# Patient Record
Sex: MALE | Race: WHITE | NOT HISPANIC OR LATINO | URBAN - METROPOLITAN AREA
[De-identification: names, ages, dates, MRNs, and addresses within clinical notes are randomized per-mention and may not be internally consistent; named-entity substitution may affect disease eponyms.]

---

## 2019-06-12 ENCOUNTER — CONVERSION ENCOUNTER (OUTPATIENT)
Dept: FAMILY MEDICINE CLINIC | Facility: CLINIC | Age: 44
End: 2019-06-12

## 2019-06-16 VITALS
DIASTOLIC BLOOD PRESSURE: 86 MMHG | WEIGHT: 132 LBS | SYSTOLIC BLOOD PRESSURE: 111 MMHG | HEART RATE: 88 BPM | RESPIRATION RATE: 18 BRPM | BODY MASS INDEX: 17.49 KG/M2 | HEIGHT: 73 IN

## 2019-06-16 NOTE — PROGRESS NOTES
Visit Type:  Consult  Referring Provider:  Sameer Sales  Primary Care Provider:  Mónica    Chief Complaint:  New Pt Consult .    History of Present Illness:  Dear Dr. Sales    I had the pleasure of seeing Luciano today in the establishment of care.  As you are well aware this is a pleasant 43-year-old  male with no known history of ischemic heart disease.  He does however have a history of chronic pain, neuropathy, and   anxiety.  He presents today to establish care following a diagnosis of bradycardia.    He denies any chest pain pressure heaviness or tightness.  He denies any shortness of breath.  He denies any PND orthopnea.  He denies any syncope or near syncope.    He underwent cervical fusion surgery earlier this year and was found to have bradycardia postoperatively.  He has a bone stimulator in place.  We discussed options.  He will wait until his bone stimulator has been removed and then consider further work-up.    Impressions  Bradycardia most likely secondary to carotid manipulation from cervical fusion surgery.  Anxiety  Chronic pain  Neuropathy.  Tobacco abuse d/o    Recommendations  Consider ambulatory ECG monitor once bone stimulator has been removed.  Follow-up in 6 months time sooner should there be difficulties.  Smoking cessation      Vital Signs -Extended   Height: 73 inches   Weight: 132 pounds  Pulse rate: 88 /min     Pulse rhythm: regular       Respirations: 18 /min  Blood Pressure: 111/86 mm Hg    Calculations   Body Mass Index: 17.48      Body Surface Area (m2): 1.81      Past Medical History:     Reviewed history from 03/19/2015 and no changes required:        Asthma        Anxiety Disorder        Arthritis        Bipolar Affective Disorder        Depression        Headache, Migraine        Myocardial Infarction: questionable    Past Surgical History:     Reviewed history from 03/19/2015 and no changes required:        Left arm surgery (reconstruction)        Eye surgery         Oral Surgery    Active Medications (reviewed today):  DICLOFENAC SODIUM 75 MG ORAL TABLET DELAYED RELEASE (DICLOFENAC SODIUM) 1 tablet twice daily  GABAPENTIN 300 MG ORAL CAPSULE (GABAPENTIN) 1 tablet thee times daily  CLONAZEPAM 0.5 MG ORAL TABLET (CLONAZEPAM) 2 tablets daily    Current Allergies (reviewed today):  * STADOL (Moderate)  PHENERGAN (Moderate)  * MORPHINE (LIQUID) (Moderate)  TRAMADOL HCL (TRAMADOL HCL) (Moderate)    Family History Summary:      Reviewed history Last on 04/16/2015 and no changes required:06/16/2019      General Comments - FH:  unremarkable    Social History:     Reviewed history from 03/19/2015 and no changes required:                3 children        Disabled        Risk Factors:     Smoked Tobacco Use:  Current every day smoker     Cigarettes:  Yes -- 1/2 pack(s) per day,      Years smoked:  20 years  Smokeless Tobacco Use:  Former     Counseled to quit/cut down:  yes     Tobacco Use Comments:  tobacco dip  Passive smoke exposure:  no  Drug use:  no  HIV high-risk behavior:  no  Caffeine use:  4+ drinks per day  Alcohol use:  no  Exercise:  yes     Times per week:  8+     Type of Exercise:  walking/patient owns no vehicle  Seatbelt use:  0 %  Sun Exposure:  occasionally    Family History Risk Factors:     Family History of MI in females < 65 years old:  no     Family History of MI in males < 55 years old:  no        Review of Systems     A complete review of systems was undertaken with a pertinent information being listed in history present illness and all other systems being negative.      Physical Exam    General:      well developed, well nourished, in no acute distress.    Head:      normocephalic and atraumatic.    Eyes:      No icterus or conjunctivitis.    Mouth:      Mucous membranes moist no lesions or ulcerations.    Neck:      Supple no lymphadenopathy JVD or bruit.    Lungs:      Clear to auscultation bilaterally no wheezes rhonchi or rales are  noted.    Abdomen:      Soft nontender nondistended bowel sounds positive throughout.    Neurologic:      Alert and oriented x3 cranial nerves II through XII are grossly intact.    Psych:      alert and cooperative; normal mood and affect; normal attention span and concentration.        Blood Pressure:  Today's BP: 111/86 mm Hg          Detailed Cardiovascular Exam    Neck     Carotids: Carotids full and equal bilaterally without bruits.       Neck Veins: Normal, no JVD.      Heart     Inspection: no deformities or lifts noted.       Palpation: normal PMI with no thrills palpable.       Auscultation: regular rate and rhythm, S1, S2 without murmurs, rubs, gallops, or clicks.      Vascular     Pedal Pulses:  bilateral equal radial pulsesnormal pedal pulses bilaterally.       Radial Pulses: normal radial pulses bilaterally.       Peripheral Circulation: no clubbing, cyanosis, or edema noted with normal capillary refill.        EKG Interpretation   Comments: Normal sinus rhythm with a ventricular rate of 88 bpm.  Left atrial enlargement.  Consider biatrial enlargement.  Old anteroseptal MI.  Normal QT and QTc intervals.    Assessment   Status of Existing Problems:  Assessed Anxiety Disorder as unchanged - Sameer Whalen MD  New Problems:  Dx of Neck pain (ICD-723.1)  Onset: 06/12/2019  Dx of Bradycardia (ICD-427.89)  Onset: 06/12/2019    Plan   New medications:  OXYCODONE-ACETAMINOPHEN  MG ORAL TABLET -- 1 po BID  Start date: 03/19/2015  GABAPENTIN 800 MG ORAL TABLET -- p.o. b.i.d  Start date: 03/19/2015                      Medication Administration    Orders Added:  1)  EKG (In House) [96386]  2)  Ofc Vst, New Level III [32853]    ]      Electronically signed by Sameer Whalen MD on 06/16/2019 at 3:41 PM  ________________________________________________________________________       Disclaimer: Converted Note message may not contain all data elements that existed in the legacy source system.  Please see [employer] [facility] for the original note details.